# Patient Record
Sex: MALE | Race: WHITE | NOT HISPANIC OR LATINO
[De-identification: names, ages, dates, MRNs, and addresses within clinical notes are randomized per-mention and may not be internally consistent; named-entity substitution may affect disease eponyms.]

---

## 2019-01-22 PROBLEM — Z00.00 ENCOUNTER FOR PREVENTIVE HEALTH EXAMINATION: Status: ACTIVE | Noted: 2019-01-22

## 2019-01-29 ENCOUNTER — APPOINTMENT (OUTPATIENT)
Dept: ORTHOPEDIC SURGERY | Facility: CLINIC | Age: 34
End: 2019-01-29
Payer: COMMERCIAL

## 2019-01-29 VITALS
DIASTOLIC BLOOD PRESSURE: 62 MMHG | OXYGEN SATURATION: 98 % | WEIGHT: 175 LBS | BODY MASS INDEX: 24.5 KG/M2 | SYSTOLIC BLOOD PRESSURE: 104 MMHG | HEIGHT: 71 IN | HEART RATE: 80 BPM

## 2019-01-29 PROCEDURE — 99204 OFFICE O/P NEW MOD 45 MIN: CPT

## 2019-01-29 PROCEDURE — 73562 X-RAY EXAM OF KNEE 3: CPT | Mod: RT

## 2019-01-29 NOTE — DISCUSSION/SUMMARY
[Medication Risks Reviewed] : Medication risks reviewed [Surgical risks reviewed] : Surgical risks reviewed [de-identified] : 33 year old male presents s/p ACL reconstruction with a new ACL graft tear. Patient completed an MRI on 01/12/19, which revealed complete mid substance graft tear, MCL strain. In view of patient's imaging results, I advised that he have an ACL reconstruction revision using allograft. I informed the patient that they may continue low-impact activities as tolerated, but he should avoid high-impact activities and exercises such as deep squats, and lunges. He wishes to further consider surgery, and will call to schedule when he is ready.\par \par With the diagnosis of a completely torn anterior cruciate ligament (ACL), the choices are nonoperative versus operative. The nonoperative success is approximately a 33% chance of the patients returning to having a functional knee again. By that I mean back to their sport endeavors. Two-thirds of the patients for the most part and as a generalization will go on to have problems such as tears in the meniscus, destruction of articular cartilage, and eventually arthritis. All of these patients will be required to use a brace and must always participate in a conditioning (strengthening) program, if they plan to participate in any sports.\par \par The other option is operative intervention, which, today, yields success rates ranging from 85 to 90%. Success is once again defined as an ability to return to a functional sporting lifestyle with or without a brace. In this case, I would, if the patient chooses surgery recommend an anterior cruciate ligament reconstruction with bone patella tendon bone allograft. The allograft, which necessitated a 2-3 inch skin incision, is arthroscopically positioned through the tibial and femoral tunnels to isometrically recreate the normal tension of the anterior cruciate ligament. The graft, once situated, is then fixed with two screws that are compressive within the tunnels. Sometimes on the tibial side, we use a tibial post, where a suture is tied around a screw placed perpendicular to the shaft of the tibia. The choice of technique is dependent upon the length of the bone tendon bone preparation. In some cases it is necessary due to subsequent discomfort that the screw be removed a year or so after surgery.\par \par The usual course for an ACL reconstruction is hospitalization of 24 hours (range 0 to 48 hours) with immediate continuous passive motion and partial to full weight bearing crutch walking. Crutches are usually dispensed with by 2 to 3 weeks.\par \par Patients who have desk jobs are usually back at work within a week. Driving is not permitted until there is good control of this leg. It will usually be 3 to 6 weeks that the patient is restricted from driving. The motion stage of therapy takes about 3 weeks while the strength stage averages 6 months from surgery for patients to return to sports. I let the patient know that some people have done it as early as 3 months, which I think is somewhat unrealistic. I have seen people not work hard and become a disaster, taking as long as 2 years.\par \par While the success rate (return to a functional sports lifestyle) is 85 to 90% potential, there are adverse complications.\par Complications include infection (approximately 1%) peroneal nerve palsies which would mean loss of foot function (<1%), loss of screw fixation at either the tibial or femoral tunnels (<1%), fracture of the patella (<1%), rupture of the remaining patella tendon (<1%), and fracture of the tibia where the bone graft was taken from (<1%). Other bizarre complications include reflex sympathetic dystrophy, which means that the patient has pain, which is totally out of proportion with the findings. This requires multiple manipulation procedures in physical therapy and can become an overbearing part of the patient&#39;s life for well over several years. Many of the manipulations might have to be performed with associated arthroscopy.\par \par Infrapatellar contracture syndromes are also another rare complication where the patient has limited extension and flexion due to scarring. This is also a very difficult experience for the patient and will often require multiple surgical procedures, arthroscopy, manipulation, and sometimes arthrotomy. I have informed the patient that the meniscus and/or articular cartilage can be injured concurrently with the anterior cruciate ligament. With the meniscus tears the basic idea is to repair (if possible) or remove as little of the tear as necessary. When either of these procedures is performed the postoperative routine are potential complications parallel those of the ACL reconstruction.\par With articular cartilage damage, the surface can be shaved, and in certain situations a portion placed back in situ.\par \par Sometimes the only treatment is drilling or eburnating the bone for the theoretical advantage of stimulating fibrocartilage growth (50/50 chance). When either of these procedures is performed the postoperative routine are potential complications parallel those of the ACL reconstruction.\par \par The allograft is basically a cadaver transplant of a bone patella tendon bone preparation. Other than the lack of harvesting the patients tissue, there are basically no differences in the surgical technique of an autograft. However, there are two major sources of concern using an allograft. One, there is a theoretical albeit extremely rare risk of AIDS. We have heard figures suggesting a statistical risk from 1 in 2 million to 1 in 8 million. Obviously these are low risk of a fatal disease exist. For any one patient, there is really 0 or 100% risk of ayo the HIV virus. The second issue, is the ability of the patient (host) to incorporate the allograft. At the present time there does not seem to be any long term adverse effects that negate the early effectiveness of allograft reconstruction for a deficient anterior cruciate ligament. The biology does, however, seem to mean that true incorporation is microscopically longer than an autograft procedure.\par \par Further studies are unquestionably forthcoming and at this point this is the best information that I can give this patient.\par \par I have informed this patient that any one of these complications can basically be a life long disaster and can make them much worse off then should they have chosen the non-operative treatment. I think the patient understands the potential complications. I think the patient also understands the percentage of successes and failures. The choice is now theirs.

## 2019-01-29 NOTE — HISTORY OF PRESENT ILLNESS
[de-identified] : 33 year old male presents to the office for an initial evaluation of right knee pain since 12/28/18 after trampling. Patient rates pain as a 3/10 in severity, and describes it as constant in nature. Pain is characterized as achy and is exacerbated by prolonged periods of walking. He notes that nothing helps to alleviate his pain. Patient has seen another physician for this issue in the past, and completed an MRI, which revealed complete mid substance graft tear, MCL strain. Of note, he reports a past surgical history of an ACL reconstruction. He has attempted treatment with NSAIDs and the use of a brace, which has only minimally helped to resolve his pain. Patient reports associated swelling, but denies locking, buckling, and clicking.

## 2019-01-29 NOTE — ADDENDUM
[FreeTextEntry1] : Documented by Chelsea Swift, solely acting as a scribe for Dr. Ivan Castellon on 01/29/2019.\par \par All medical record entries made by the Scribe were at my, Dr. Ivan Castellon, direction and personally dictated by me on 01/29/2019 . I have reviewed the chart and agree that the record accurately reflects my personal performance of the history, physical exam, assessment and plan. I have also personally directed, reviewed, and agreed with the chart.

## 2019-01-29 NOTE — PHYSICAL EXAM
[Normal] : Gait: normal [LE] : Sensory: Intact in bilateral lower extremities [DP] : dorsalis pedis 2+ and symmetric bilaterally [PT] : posterior tibial 2+ and symmetric bilaterally [Poor Appearance] : well-appearing [Acute Distress] : not in acute distress [Obese] : not obese [de-identified] : General appearance: Well nourished, well developed, pleasant, alert, and oriented x3.\par Respiratory: Breathing not labored, in no acute distress.\par HEENT: Normocephalic. EOM intact. Sclerae are clear.\par CV: No apparent abnormalities. No lower leg edema. No varicosities. Pedal pulses are palpable.\par Neurologic: Sensation is intact to light touch in the upper and lower extremities. \par Strength: No muscle weakness.\par Dermatologic: No apparent skin lesions or rash.\par Spine: C spine and L spine appear normal and move freely, normal and nontender.\par Upper Extremities: Hands, wrists, and elbows are normal and move freely. Shoulders are normal and move freely. All range of motion is symmetrical.\par Normal body habitus. Pulses are palpable.\par Review of systems, please see form for complete details. Medical data sheet was reviewed.\par \par Left knee: FROM hip, no effusion, 0 - 140, no crepitus, no medial pain, no lateral pain, no Lachman, no pivot shift, no anterior drawer, no posterior drawer, stable, anatomic alignment\par Right knee: FROM hip, midline incision well healed, no effusion, 0 - 140, no crepitus, no medial pain, no lateral pain, + Lachman, glide on pivot shift, no anterior drawer, no posterior drawer, MCL stable, anatomic alignment, 2 QG, neutral position\par  [de-identified] : X-rays, taken at the office today show:\par \par Right Knee x-rays:\par Standing AP, Lateral, and Merchant films:\par Components in good position, minimal widening, no fractures noted.\par \par \par MRI of the right knee reviewed today by Dr. Castellon shows new ACL tear s/p ACL reconstruction, MCL strain.\par Please see attached report for details.

## 2019-03-21 ENCOUNTER — FORM ENCOUNTER (OUTPATIENT)
Age: 34
End: 2019-03-21

## 2019-03-21 NOTE — ASU PATIENT PROFILE, ADULT - PSH
ACL injury tear  right knee  Elective surgery  jaw surgery  H/O adenoidectomy ACL injury tear  right knee with screws  Elective surgery  jaw surgery with screws  H/O adenoidectomy

## 2019-03-21 NOTE — ASU PATIENT PROFILE, ADULT - PMH
No pertinent past medical history <<----- Click to add NO pertinent Past Medical History Statement Selected

## 2019-03-22 ENCOUNTER — RESULT REVIEW (OUTPATIENT)
Age: 34
End: 2019-03-22

## 2019-03-22 ENCOUNTER — OUTPATIENT (OUTPATIENT)
Dept: OUTPATIENT SERVICES | Facility: HOSPITAL | Age: 34
LOS: 1 days | Discharge: HOME | End: 2019-03-22
Payer: COMMERCIAL

## 2019-03-22 ENCOUNTER — APPOINTMENT (OUTPATIENT)
Dept: ORTHOPEDIC SURGERY | Facility: HOSPITAL | Age: 34
End: 2019-03-22

## 2019-03-22 VITALS
DIASTOLIC BLOOD PRESSURE: 71 MMHG | HEIGHT: 70 IN | OXYGEN SATURATION: 98 % | WEIGHT: 169.98 LBS | SYSTOLIC BLOOD PRESSURE: 117 MMHG | TEMPERATURE: 98 F | HEART RATE: 68 BPM | RESPIRATION RATE: 20 BRPM

## 2019-03-22 VITALS
RESPIRATION RATE: 15 BRPM | SYSTOLIC BLOOD PRESSURE: 116 MMHG | DIASTOLIC BLOOD PRESSURE: 69 MMHG | HEART RATE: 52 BPM | OXYGEN SATURATION: 99 %

## 2019-03-22 DIAGNOSIS — Z90.89 ACQUIRED ABSENCE OF OTHER ORGANS: Chronic | ICD-10-CM

## 2019-03-22 DIAGNOSIS — S83.519A SPRAIN OF ANTERIOR CRUCIATE LIGAMENT OF UNSPECIFIED KNEE, INITIAL ENCOUNTER: Chronic | ICD-10-CM

## 2019-03-22 DIAGNOSIS — Z41.9 ENCOUNTER FOR PROCEDURE FOR PURPOSES OTHER THAN REMEDYING HEALTH STATE, UNSPECIFIED: Chronic | ICD-10-CM

## 2019-03-22 PROCEDURE — 73560 X-RAY EXAM OF KNEE 1 OR 2: CPT | Mod: 26,RT

## 2019-03-22 PROCEDURE — 73560 X-RAY EXAM OF KNEE 1 OR 2: CPT

## 2019-03-22 PROCEDURE — 29888 ARTHRS AID ACL RPR/AGMNTJ: CPT | Mod: RT

## 2019-03-22 PROCEDURE — C1889: CPT

## 2019-03-22 PROCEDURE — 88304 TISSUE EXAM BY PATHOLOGIST: CPT

## 2019-03-22 PROCEDURE — C1713: CPT

## 2019-03-22 RX ORDER — OXYCODONE AND ACETAMINOPHEN 5; 325 MG/1; MG/1
1 TABLET ORAL EVERY 4 HOURS
Qty: 0 | Refills: 0 | Status: DISCONTINUED | OUTPATIENT
Start: 2019-03-22 | End: 2019-03-22

## 2019-03-22 RX ORDER — SODIUM CHLORIDE 9 MG/ML
1000 INJECTION, SOLUTION INTRAVENOUS
Qty: 0 | Refills: 0 | Status: DISCONTINUED | OUTPATIENT
Start: 2019-03-22 | End: 2019-03-22

## 2019-03-22 RX ORDER — NABUMETONE 750 MG
1 TABLET ORAL
Qty: 14 | Refills: 0 | OUTPATIENT
Start: 2019-03-22 | End: 2019-03-28

## 2019-03-22 RX ORDER — MORPHINE SULFATE 50 MG/1
4 CAPSULE, EXTENDED RELEASE ORAL
Qty: 0 | Refills: 0 | Status: DISCONTINUED | OUTPATIENT
Start: 2019-03-22 | End: 2019-03-22

## 2019-03-22 RX ORDER — ONDANSETRON 8 MG/1
4 TABLET, FILM COATED ORAL ONCE
Qty: 0 | Refills: 0 | Status: DISCONTINUED | OUTPATIENT
Start: 2019-03-22 | End: 2019-03-22

## 2019-03-22 RX ORDER — OXYCODONE AND ACETAMINOPHEN 5; 325 MG/1; MG/1
2 TABLET ORAL EVERY 4 HOURS
Qty: 0 | Refills: 0 | Status: DISCONTINUED | OUTPATIENT
Start: 2019-03-22 | End: 2019-03-22

## 2019-03-22 RX ADMIN — OXYCODONE AND ACETAMINOPHEN 2 TABLET(S): 5; 325 TABLET ORAL at 13:00

## 2019-03-22 RX ADMIN — MORPHINE SULFATE 4 MILLIGRAM(S): 50 CAPSULE, EXTENDED RELEASE ORAL at 12:50

## 2019-03-22 RX ADMIN — OXYCODONE AND ACETAMINOPHEN 2 TABLET(S): 5; 325 TABLET ORAL at 12:22

## 2019-03-22 NOTE — ASU PREOP CHECKLIST - INTERNAL PROSTHESES
screws in right knee and jaw/yes(specify) yes(specify)/screws in right knee and jaw, permanent retainer

## 2019-03-25 ENCOUNTER — OTHER (OUTPATIENT)
Age: 34
End: 2019-03-25

## 2019-03-25 PROBLEM — Z78.9 OTHER SPECIFIED HEALTH STATUS: Chronic | Status: ACTIVE | Noted: 2019-03-21

## 2019-03-28 LAB — SURGICAL PATHOLOGY STUDY: SIGNIFICANT CHANGE UP

## 2019-04-18 ENCOUNTER — APPOINTMENT (OUTPATIENT)
Dept: ORTHOPEDIC SURGERY | Facility: CLINIC | Age: 34
End: 2019-04-18
Payer: COMMERCIAL

## 2019-04-18 DIAGNOSIS — Z98.890 OTHER SPECIFIED POSTPROCEDURAL STATES: ICD-10-CM

## 2019-04-18 PROCEDURE — 99024 POSTOP FOLLOW-UP VISIT: CPT

## 2019-04-18 PROCEDURE — 73562 X-RAY EXAM OF KNEE 3: CPT | Mod: RT

## 2019-04-18 NOTE — HISTORY OF PRESENT ILLNESS
[___ Weeks Post Op] : [unfilled] weeks post op [Clean/Dry/Intact] : clean, dry and intact [Neuro Intact] : an unremarkable neurological exam [Doing Well] : is doing well [Vascular Intact] : ~T peripheral vascular exam normal [No Sign of Infection] : is showing no signs of infection [Excellent Pain Control] : has excellent pain control [Healed] : healed [Xray (Date:___)] : [unfilled] Xray -  [Chills] : no chills [Constipation] : no constipation [Diarrhea] : no diarrhea [Dysuria] : no dysuria [Fever] : no fever [Nausea] : no nausea [Vomiting] : no vomiting [Erythema] : not erythematous [Discharge] : absent of discharge [Swelling] : not swollen [Dehiscence] : not dehisced [de-identified] : s/p right knee arthroscopy, ACL revision with allograft, DOS: 03/22/19. [de-identified] : 33 year old male presents to the office s/p right knee arthroscopy, ACL revision with allograft, DOS: 03/22/19 for a post-operative evaluation. Patient is doing generally well and denies any current complaints. He is satisfied with his postoperative progress, noting that pain is well controlled. Patient is ambulating freely at this time. He has been compliant with PT. [de-identified] : Right Knee x-rays, taken at the office today:\par Standing AP, Lateral, and Merchant films show:\par Hardware in good position. [de-identified] : Right knee: FROM hip, portal incisions well healed, ACL incision well healed, no effusion, 0 - 135 degrees, no crepitus, no medial pain, no lateral pain, no Lachman, no pivot shift, no anterior or posterior drawer, stable, anatomic alignment, no signs of infection, no signs of blood clot.

## 2019-04-18 NOTE — PROCEDURE
[de-identified] : 33 year old male presents s/p right knee arthroscopy, ACL revision with allograft, DOS: 03/22/19. Patient is doing generally well and denies any current complaints. He is satisfied with his postoperative progress, noting that pain is well controlled. Patient is ambulating freely at this time. He has been compliant with PT. Patient's physical exam is unremarkable, and shows no sign of blood clot or infection. I reassured the patient that any remaining residual discomfort will lessen with time. At this point, I recommend that he continue with PT for further strengthening of the surrounding musculature of the knee. A new prescription for Physical Therapy was provided. Information regarding the new office was provided to the patient, and he was advised to contact the office if any questions remain. Patient will follow up in 8 weeks at the new office.

## 2019-04-18 NOTE — ADDENDUM
[FreeTextEntry1] : Documented by Chelsea Swift, solely acting as a scribe for Dr. Ivan Castellon on 04/18/2019.\par \par All medical record entries made by the Scribe were at my, Dr. Ivan Castellon, direction and personally dictated by me on 04/18/2019. I have reviewed the chart and agree that the record accurately reflects my personal performance of the history, physical exam, assessment and plan. I have also personally directed, reviewed, and agreed with the chart.

## 2020-08-22 ENCOUNTER — TRANSCRIPTION ENCOUNTER (OUTPATIENT)
Age: 35
End: 2020-08-22

## 2020-09-02 ENCOUNTER — TRANSCRIPTION ENCOUNTER (OUTPATIENT)
Age: 35
End: 2020-09-02

## 2020-10-16 ENCOUNTER — TRANSCRIPTION ENCOUNTER (OUTPATIENT)
Age: 35
End: 2020-10-16

## 2020-12-15 ENCOUNTER — TRANSCRIPTION ENCOUNTER (OUTPATIENT)
Age: 35
End: 2020-12-15

## 2022-05-27 ENCOUNTER — NON-APPOINTMENT (OUTPATIENT)
Age: 37
End: 2022-05-27

## 2023-05-29 NOTE — ASU PREOP CHECKLIST - TAMPON REMOVED
Goal Outcome Evaluation:      Plan of Care Reviewed With: patient             Summary Bladder outlet obstruction     5/28/2023 [id number]    Orientation A& O X4    Vitals/Tele VSS on Rm air     IV Access/drains PIV SL    Diet Reg     Mobility Ind     GI/ Pressley watermelon color, No BM today, active bowl sounds dried blood around pressley insertion site cleaned,     Wound/Skin WNL, BLE edema has compression socks on    Discharge Plan Home tomorrow    Started back on Warfarin today   No complaints of pain  See Flow sheets for assessment     n/a